# Patient Record
Sex: MALE | Race: WHITE
[De-identification: names, ages, dates, MRNs, and addresses within clinical notes are randomized per-mention and may not be internally consistent; named-entity substitution may affect disease eponyms.]

---
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
## 2020-05-08 RX ORDER — MUPIROCIN 20 MG/G
2 OINTMENT TOPICAL
Qty: 1 | Refills: 0 | Status: ACTIVE | COMMUNITY
Start: 2020-05-08 | End: 1900-01-01

## 2020-06-01 ENCOUNTER — APPOINTMENT (OUTPATIENT)
Dept: PLASTIC SURGERY | Facility: CLINIC | Age: 20
End: 2020-06-01

## 2021-06-09 ENCOUNTER — APPOINTMENT (OUTPATIENT)
Dept: OTOLARYNGOLOGY | Facility: CLINIC | Age: 21
End: 2021-06-09
Payer: COMMERCIAL

## 2021-06-09 VITALS — HEIGHT: 70 IN | BODY MASS INDEX: 24.34 KG/M2 | WEIGHT: 170 LBS

## 2021-06-09 DIAGNOSIS — J34.89 OTHER SPECIFIED DISORDERS OF NOSE AND NASAL SINUSES: ICD-10-CM

## 2021-06-09 DIAGNOSIS — H61.22 IMPACTED CERUMEN, LEFT EAR: ICD-10-CM

## 2021-06-09 PROCEDURE — 31231 NASAL ENDOSCOPY DX: CPT

## 2021-06-09 PROCEDURE — 69210 REMOVE IMPACTED EAR WAX UNI: CPT

## 2021-06-09 PROCEDURE — 99204 OFFICE O/P NEW MOD 45 MIN: CPT | Mod: 25

## 2021-06-09 PROCEDURE — 99072 ADDL SUPL MATRL&STAF TM PHE: CPT

## 2021-06-09 RX ORDER — FLUTICASONE PROPIONATE 50 UG/1
50 SPRAY, METERED NASAL
Qty: 1 | Refills: 2 | Status: ACTIVE | COMMUNITY
Start: 2021-06-09 | End: 1900-01-01

## 2021-06-09 NOTE — PHYSICAL EXAM
[Midline] : trachea located in midline position [Normal] : no rashes [Nasal Endoscopy Performed] : nasal endoscopy was performed, see procedure section for findings [de-identified] : left impacted wax cleaned with curette

## 2021-06-09 NOTE — HISTORY OF PRESENT ILLNESS
[de-identified] : Patient presents today with c/o nasal obstruction, upper facial pressure.  Mother states has been present since he was a baby. Mother admits at least 8 infections per year, usually treated with abx. Takes Zyrtec and allergy medication daily. Symptoms include headaches, nasal congestion, fatigue, facial pain. Patient usually has improvement on Augmentin. had CT sinus many years ago. hx of nasal allergies, on daily zyrtec. Mother does not wish to have medical management for this problem any longer

## 2021-06-09 NOTE — ASSESSMENT
[FreeTextEntry1] : continue zyrtec and singulair.\par \par Part of history and discussion with patient's mother.\par \par Possible recurrent sinusitis vs migraine.